# Patient Record
Sex: MALE | Race: WHITE | NOT HISPANIC OR LATINO
[De-identification: names, ages, dates, MRNs, and addresses within clinical notes are randomized per-mention and may not be internally consistent; named-entity substitution may affect disease eponyms.]

---

## 2019-01-22 PROBLEM — Z00.00 ENCOUNTER FOR PREVENTIVE HEALTH EXAMINATION: Status: ACTIVE | Noted: 2019-01-22

## 2019-02-11 ENCOUNTER — RX RENEWAL (OUTPATIENT)
Age: 59
End: 2019-02-11

## 2019-02-11 ENCOUNTER — RECORD ABSTRACTING (OUTPATIENT)
Age: 59
End: 2019-02-11

## 2019-03-22 ENCOUNTER — APPOINTMENT (OUTPATIENT)
Dept: UROLOGY | Facility: CLINIC | Age: 59
End: 2019-03-22

## 2019-08-29 ENCOUNTER — RECORD ABSTRACTING (OUTPATIENT)
Age: 59
End: 2019-08-29

## 2019-08-29 DIAGNOSIS — Z87.898 PERSONAL HISTORY OF OTHER SPECIFIED CONDITIONS: ICD-10-CM

## 2019-08-29 DIAGNOSIS — Z82.49 FAMILY HISTORY OF ISCHEMIC HEART DISEASE AND OTHER DISEASES OF THE CIRCULATORY SYSTEM: ICD-10-CM

## 2019-08-29 DIAGNOSIS — R07.89 OTHER CHEST PAIN: ICD-10-CM

## 2019-08-29 DIAGNOSIS — Z80.9 FAMILY HISTORY OF MALIGNANT NEOPLASM, UNSPECIFIED: ICD-10-CM

## 2019-08-29 DIAGNOSIS — Z83.3 FAMILY HISTORY OF DIABETES MELLITUS: ICD-10-CM

## 2019-08-29 DIAGNOSIS — Z78.9 OTHER SPECIFIED HEALTH STATUS: ICD-10-CM

## 2019-10-15 DIAGNOSIS — Z87.891 PERSONAL HISTORY OF NICOTINE DEPENDENCE: ICD-10-CM

## 2019-10-15 DIAGNOSIS — Z82.49 FAMILY HISTORY OF ISCHEMIC HEART DISEASE AND OTHER DISEASES OF THE CIRCULATORY SYSTEM: ICD-10-CM

## 2019-10-15 DIAGNOSIS — I25.10 ATHEROSCLEROTIC HEART DISEASE OF NATIVE CORONARY ARTERY W/OUT ANGINA PECTORIS: ICD-10-CM

## 2019-10-16 ENCOUNTER — NON-APPOINTMENT (OUTPATIENT)
Age: 59
End: 2019-10-16

## 2019-10-16 ENCOUNTER — APPOINTMENT (OUTPATIENT)
Dept: CARDIOLOGY | Facility: CLINIC | Age: 59
End: 2019-10-16
Payer: COMMERCIAL

## 2019-10-16 VITALS
HEIGHT: 69 IN | BODY MASS INDEX: 28.14 KG/M2 | WEIGHT: 190 LBS | HEART RATE: 61 BPM | DIASTOLIC BLOOD PRESSURE: 70 MMHG | SYSTOLIC BLOOD PRESSURE: 110 MMHG

## 2019-10-16 PROCEDURE — 99214 OFFICE O/P EST MOD 30 MIN: CPT

## 2019-10-16 PROCEDURE — 93000 ELECTROCARDIOGRAM COMPLETE: CPT

## 2019-10-16 RX ORDER — GABAPENTIN 300 MG/1
300 CAPSULE ORAL ONCE
Refills: 0 | Status: ACTIVE | COMMUNITY

## 2019-10-16 NOTE — HISTORY OF PRESENT ILLNESS
[FreeTextEntry1] : This 59 year-old male patient presents for cardiovascular evaluation.\par \par His problem list is as noted above.\par \par Since last examination approximately 1 year ago the patient has had a significant medical event.  The patient suffered a slip and fall on his way to work in January 2019 and suffered a neck injury.  He was initially evaluated at [facility] and discharged home.  He subsequently had evaluation in [place] and was found to have a major cervical disc–spine injury.  He reports that he had weakness of all 4 extremities.  He underwent urgent surgery.  The details are not available.  He underwent extensive rehabilitation.  There was no cardiovascular complication.\par \par The patient has made progress with physical therapy but continues with a major gait disturbance.  He is recovering some of his strength but walks hesitantly.  There have been no falls.  No symptoms of chest discomfort, shortness of breath, palpitation, lightheadedness.

## 2019-10-16 NOTE — DISCUSSION/SUMMARY
[FreeTextEntry1] : Brief recommendations and follow-up: (see above for details)\par \par Patient is making a satisfactory recovery after major cervical spine trauma and surgery.\par There were no cardiovascular complications.\par His overall cardiovascular status is stable.\par He will provide recent laboratories.\par Continue current routine.\par Next routine follow-up 1 year

## 2019-10-16 NOTE — PHYSICAL EXAM
[General Appearance - Well Developed] : well developed [Normal Appearance] : normal appearance [Well Groomed] : well groomed [General Appearance - Well Nourished] : well nourished [No Deformities] : no deformities [General Appearance - In No Acute Distress] : no acute distress [Normal Conjunctiva] : the conjunctiva exhibited no abnormalities [Eyelids - No Xanthelasma] : the eyelids demonstrated no xanthelasmas [Normal Oral Mucosa] : normal oral mucosa [No Oral Pallor] : no oral pallor [No Oral Cyanosis] : no oral cyanosis [Normal Jugular Venous A Waves Present] : normal jugular venous A waves present [Normal Jugular Venous V Waves Present] : normal jugular venous V waves present [No Jugular Venous Posadas A Waves] : no jugular venous posadsa A waves [Respiration, Rhythm And Depth] : normal respiratory rhythm and effort [Exaggerated Use Of Accessory Muscles For Inspiration] : no accessory muscle use [Auscultation Breath Sounds / Voice Sounds] : lungs were clear to auscultation bilaterally [Heart Rate And Rhythm] : heart rate and rhythm were normal [Heart Sounds] : normal S1 and S2 [Murmurs] : no murmurs present [Abdomen Soft] : soft [Abdomen Tenderness] : non-tender [Abdomen Mass (___ Cm)] : no abdominal mass palpated [Nail Clubbing] : no clubbing of the fingernails [Cyanosis, Localized] : no localized cyanosis [Petechial Hemorrhages (___cm)] : no petechial hemorrhages [Skin Color & Pigmentation] : normal skin color and pigmentation [] : no rash [No Venous Stasis] : no venous stasis [Skin Lesions] : no skin lesions [No Skin Ulcers] : no skin ulcer [No Xanthoma] : no  xanthoma was observed [Oriented To Time, Place, And Person] : oriented to person, place, and time [Affect] : the affect was normal [Mood] : the mood was normal [No Anxiety] : not feeling anxious [FreeTextEntry1] : He has a gait disturbance.

## 2019-10-16 NOTE — REASON FOR VISIT
[FreeTextEntry1] : Mr. TAYLER MARKS has the following problem list:\par \par Coronary artery disease/abnormal stress mibi\par Hypertension\par Dyslipidemia.\par \par He has additional medical problems as noted.\par \par His primary care physician is Dr. Murphy Clarke.

## 2020-01-02 ENCOUNTER — RX RENEWAL (OUTPATIENT)
Age: 60
End: 2020-01-02

## 2020-11-16 ENCOUNTER — APPOINTMENT (OUTPATIENT)
Dept: CARDIOLOGY | Facility: CLINIC | Age: 60
End: 2020-11-16
Payer: COMMERCIAL

## 2020-11-16 ENCOUNTER — NON-APPOINTMENT (OUTPATIENT)
Age: 60
End: 2020-11-16

## 2020-11-16 VITALS
BODY MASS INDEX: 27.7 KG/M2 | HEIGHT: 69 IN | HEART RATE: 60 BPM | WEIGHT: 187 LBS | SYSTOLIC BLOOD PRESSURE: 102 MMHG | DIASTOLIC BLOOD PRESSURE: 70 MMHG

## 2020-11-16 DIAGNOSIS — Z01.89 ENCOUNTER FOR OTHER SPECIFIED SPECIAL EXAMINATIONS: ICD-10-CM

## 2020-11-16 DIAGNOSIS — Z92.89 PERSONAL HISTORY OF OTHER MEDICAL TREATMENT: ICD-10-CM

## 2020-11-16 PROCEDURE — 99072 ADDL SUPL MATRL&STAF TM PHE: CPT

## 2020-11-16 PROCEDURE — 93000 ELECTROCARDIOGRAM COMPLETE: CPT

## 2020-11-16 PROCEDURE — 99214 OFFICE O/P EST MOD 30 MIN: CPT

## 2020-11-16 RX ORDER — NITROGLYCERIN 0.4 MG/1
0.4 TABLET SUBLINGUAL
Qty: 25 | Refills: 3 | Status: ACTIVE | COMMUNITY
Start: 1900-01-01 | End: 1900-01-01

## 2020-11-16 NOTE — DISCUSSION/SUMMARY
[FreeTextEntry1] : Brief recommendations and follow-up: (see above for details)\par \par Patient's overall cardiovascular status is stable and very well compensated.\par Focus on risk factor reduction.\par He is leaving the area and I wished him well.\par We will remain available on request.

## 2020-11-16 NOTE — ASSESSMENT
[FreeTextEntry1] : EKG 11/16/2020.  Sinus rhythm, within normal limits.\par EKG 10/16/2019.  Sinus rhythm.  Within normal limits.

## 2020-11-16 NOTE — HISTORY OF PRESENT ILLNESS
[FreeTextEntry1] : This 60 year-old male patient presents for cardiovascular evaluation.\par \par His problem list is as noted above.\par \par Since his last examination 1 year ago he reports no major events or hospitalizations.  He tries to stay active but he has significant neurologic functional limitations with some proximal muscle weakness.\par \par There have been no acute symptoms of shortness of breath, chest discomfort, palpitation, lightheadedness, fainting.\par \par He has seen his primary care physician and had laboratories 1 week ago.  I reviewed the findings on his phone and he will provide a hard copy for my records.\par \par The patient also reports that he will be leaving the area.\par

## 2021-07-06 ENCOUNTER — RX RENEWAL (OUTPATIENT)
Age: 61
End: 2021-07-06

## 2021-10-11 ENCOUNTER — RX RENEWAL (OUTPATIENT)
Age: 61
End: 2021-10-11

## 2021-10-11 RX ORDER — AMLODIPINE BESYLATE 5 MG/1
5 TABLET ORAL DAILY
Qty: 90 | Refills: 1 | Status: ACTIVE | COMMUNITY
Start: 2021-10-11 | End: 1900-01-01

## 2022-01-07 ENCOUNTER — TRANSCRIPTION ENCOUNTER (OUTPATIENT)
Age: 62
End: 2022-01-07

## 2022-01-07 ENCOUNTER — NON-APPOINTMENT (OUTPATIENT)
Age: 62
End: 2022-01-07

## 2022-01-07 ENCOUNTER — APPOINTMENT (OUTPATIENT)
Dept: CARDIOLOGY | Facility: CLINIC | Age: 62
End: 2022-01-07
Payer: MEDICARE

## 2022-01-07 VITALS
SYSTOLIC BLOOD PRESSURE: 134 MMHG | WEIGHT: 190 LBS | HEART RATE: 70 BPM | BODY MASS INDEX: 28.14 KG/M2 | DIASTOLIC BLOOD PRESSURE: 76 MMHG | HEIGHT: 69 IN | OXYGEN SATURATION: 98 %

## 2022-01-07 VITALS — DIASTOLIC BLOOD PRESSURE: 70 MMHG | SYSTOLIC BLOOD PRESSURE: 128 MMHG

## 2022-01-07 PROCEDURE — 93000 ELECTROCARDIOGRAM COMPLETE: CPT

## 2022-01-07 PROCEDURE — 99204 OFFICE O/P NEW MOD 45 MIN: CPT | Mod: 25

## 2022-01-07 RX ORDER — OXYBUTYNIN CHLORIDE 5 MG/1
5 TABLET, EXTENDED RELEASE ORAL DAILY
Refills: 0 | Status: ACTIVE | COMMUNITY

## 2022-01-07 NOTE — DISCUSSION/SUMMARY
[FreeTextEntry1] : 1. Coronary Artery Disease: after review of previous cardiologist's note this is presumed CAD based upon a mildly abnormal nuclear stress test about 4-5 years ago. Patient currently asymptomatic. Recommend follow up pharmacologic nuclear stress testing when patient returns from [place] in Spring 2022. For now recommend continuing current medical therapy.\par \par 2. HTN: appears controlled. For now continue amlodipine 5mg daily. Would not recommend discontinuing BP medication at this time as patient will be out of state for the winter. Can reassess when he returns for the spring/summer.\par \par 3. HLD: continue pravastatin 20mg daily. Will attempt to obtain most recent labs from PCP.\par \par Follow up in 6 months.

## 2022-01-07 NOTE — HISTORY OF PRESENT ILLNESS
[FreeTextEntry1] : 61 year old male, former cardiologist was Dr. Theodore ([place]), history of CAD (mildly abnormal nuclear stress test about 4 years ago), HTN, HLD presents for a cardiac evaluation. Patient would like to know if he can come off some of his cardiac medications. Patient has no chest pain, SOB, or palpitations. He is limited however with his exertion because of a previous trauma requiring neck surgery.\par \par There is no history of MI, CVA, CHF, or previous coronary intervention.\par

## 2022-01-07 NOTE — PHYSICAL EXAM
[Normal] : moves all extremities, no focal deficits, normal speech [de-identified] : No carotid bruits auscultated bilaterally

## 2022-01-07 NOTE — REVIEW OF SYSTEMS
[Joint Pain] : joint pain [Joint Stiffness] : joint stiffness [Negative] : Neurological [FreeTextEntry5] : see HPI [FreeTextEntry9] : see HPI

## 2022-07-14 ENCOUNTER — APPOINTMENT (OUTPATIENT)
Dept: CARDIOLOGY | Facility: CLINIC | Age: 62
End: 2022-07-14

## 2022-07-18 ENCOUNTER — RX RENEWAL (OUTPATIENT)
Age: 62
End: 2022-07-18

## 2022-11-21 ENCOUNTER — APPOINTMENT (OUTPATIENT)
Dept: ULTRASOUND IMAGING | Facility: CLINIC | Age: 62
End: 2022-11-21

## 2022-11-21 PROCEDURE — 76700 US EXAM ABDOM COMPLETE: CPT

## 2022-12-22 ENCOUNTER — NON-APPOINTMENT (OUTPATIENT)
Age: 62
End: 2022-12-22

## 2022-12-22 ENCOUNTER — APPOINTMENT (OUTPATIENT)
Dept: CARDIOLOGY | Facility: CLINIC | Age: 62
End: 2022-12-22

## 2022-12-22 VITALS
DIASTOLIC BLOOD PRESSURE: 76 MMHG | SYSTOLIC BLOOD PRESSURE: 128 MMHG | WEIGHT: 180 LBS | HEIGHT: 69 IN | BODY MASS INDEX: 26.66 KG/M2 | OXYGEN SATURATION: 99 % | HEART RATE: 72 BPM

## 2022-12-22 PROCEDURE — 99214 OFFICE O/P EST MOD 30 MIN: CPT | Mod: 25

## 2022-12-22 PROCEDURE — 93000 ELECTROCARDIOGRAM COMPLETE: CPT

## 2022-12-22 RX ORDER — ASPIRIN ENTERIC COATED TABLETS 81 MG 81 MG/1
81 TABLET, DELAYED RELEASE ORAL
Refills: 0 | Status: ACTIVE | COMMUNITY
Start: 2022-12-22

## 2022-12-22 RX ORDER — KRILL/OM-3/DHA/EPA/PHOSPHO/AST 1000-230MG
81 CAPSULE ORAL DAILY
Refills: 0 | Status: DISCONTINUED | COMMUNITY
End: 2022-12-22

## 2022-12-22 NOTE — DISCUSSION/SUMMARY
[FreeTextEntry1] : 1. Coronary Artery Disease: after review of previous cardiologist's note this is presumed CAD based upon a mildly abnormal nuclear stress test about 4-5 years ago. Patient currently asymptomatic. Recommend continuing current medical therapy.\par \par 2. HTN: Goal BP less than 130/80. Recommend low salt diet. Continue amlodipine 5mg daily. \par \par 3. HLD: continue pravastatin 20mg daily. Will attempt to obtain most recent labs from PCP.\par \par Follow up in 12 months. [EKG obtained to assist in diagnosis and management of assessed problem(s)] : EKG obtained to assist in diagnosis and management of assessed problem(s)

## 2022-12-22 NOTE — PHYSICAL EXAM
[Normal] : moves all extremities, no focal deficits, normal speech [de-identified] : No carotid bruits auscultated bilaterally

## 2022-12-22 NOTE — REVIEW OF SYSTEMS
(1) body pink, extremities blue [Joint Pain] : joint pain [Joint Stiffness] : joint stiffness [Negative] : Neurological [FreeTextEntry5] : see HPI [FreeTextEntry9] : see HPI

## 2023-06-23 ENCOUNTER — RX RENEWAL (OUTPATIENT)
Age: 63
End: 2023-06-23

## 2023-09-22 ENCOUNTER — RX RENEWAL (OUTPATIENT)
Age: 63
End: 2023-09-22

## 2023-09-22 RX ORDER — PRAVASTATIN SODIUM 20 MG/1
20 TABLET ORAL
Qty: 90 | Refills: 3 | Status: ACTIVE | COMMUNITY
Start: 2021-07-06 | End: 1900-01-01

## 2023-12-22 ENCOUNTER — APPOINTMENT (OUTPATIENT)
Dept: CARDIOLOGY | Facility: CLINIC | Age: 63
End: 2023-12-22
Payer: MEDICARE

## 2023-12-22 ENCOUNTER — NON-APPOINTMENT (OUTPATIENT)
Age: 63
End: 2023-12-22

## 2023-12-22 VITALS
DIASTOLIC BLOOD PRESSURE: 70 MMHG | BODY MASS INDEX: 27.4 KG/M2 | HEART RATE: 60 BPM | HEIGHT: 69 IN | WEIGHT: 185 LBS | OXYGEN SATURATION: 98 % | SYSTOLIC BLOOD PRESSURE: 146 MMHG

## 2023-12-22 DIAGNOSIS — I10 ESSENTIAL (PRIMARY) HYPERTENSION: ICD-10-CM

## 2023-12-22 DIAGNOSIS — I25.10 ATHEROSCLEROTIC HEART DISEASE OF NATIVE CORONARY ARTERY W/OUT ANGINA PECTORIS: ICD-10-CM

## 2023-12-22 DIAGNOSIS — E78.5 HYPERLIPIDEMIA, UNSPECIFIED: ICD-10-CM

## 2023-12-22 PROCEDURE — 99214 OFFICE O/P EST MOD 30 MIN: CPT | Mod: 25

## 2023-12-22 PROCEDURE — 93000 ELECTROCARDIOGRAM COMPLETE: CPT

## 2023-12-22 NOTE — CARDIOLOGY SUMMARY
[de-identified] : 12/22/2023, Sinus Bradycardia, normal ECG 12/22/2022, Sinus Bradycardia with non-specific ST-T wave changes.

## 2023-12-22 NOTE — PHYSICAL EXAM
[Normal] : moves all extremities, no focal deficits, normal speech [de-identified] : No carotid bruits auscultated bilaterally

## 2023-12-22 NOTE — DISCUSSION/SUMMARY
[FreeTextEntry1] : 1. Coronary Artery Disease: after review of previous cardiologist's note this is presumed CAD based upon a mildly abnormal nuclear stress test about 5-6 years ago. Patient currently asymptomatic. Recommend continuing current medical therapy.  2. HTN: Goal BP less than 130/80. Recommend low salt diet. Continue amlodipine 5mg daily.   3. HLD: continue pravastatin 20mg daily. Will attempt to obtain most recent labs from PCP.  Follow up in 12 months. [EKG obtained to assist in diagnosis and management of assessed problem(s)] : EKG obtained to assist in diagnosis and management of assessed problem(s)

## 2023-12-22 NOTE — HISTORY OF PRESENT ILLNESS
[FreeTextEntry1] : Historical Perspective:\par  62 year old male, former cardiologist was Dr. Theodore ([place]), history of CAD (mildly abnormal nuclear stress test about 4 years ago), HTN, HLD presents for a cardiac evaluation. Patient would like to know if he can come off some of his cardiac medications. Patient has no chest pain, SOB, or palpitations. He is limited however with his exertion because of a previous trauma requiring neck surgery.\par  \par  There is no history of MI, CVA, CHF, or previous coronary intervention.\par  \par  Current Health Status:\par  Patient with no chest pain, SOB, or palpitations. No hospitalizations since seeing me last. Remains compliant with his medications and reports no adverse effects.\par  \par

## 2024-10-03 ENCOUNTER — NON-APPOINTMENT (OUTPATIENT)
Age: 64
End: 2024-10-03

## 2024-10-04 ENCOUNTER — APPOINTMENT (OUTPATIENT)
Dept: CARDIOLOGY | Facility: CLINIC | Age: 64
End: 2024-10-04
Payer: MEDICARE

## 2024-10-04 VITALS
WEIGHT: 190 LBS | SYSTOLIC BLOOD PRESSURE: 152 MMHG | HEIGHT: 69 IN | HEART RATE: 96 BPM | BODY MASS INDEX: 28.14 KG/M2 | DIASTOLIC BLOOD PRESSURE: 70 MMHG | OXYGEN SATURATION: 96 %

## 2024-10-04 DIAGNOSIS — I10 ESSENTIAL (PRIMARY) HYPERTENSION: ICD-10-CM

## 2024-10-04 DIAGNOSIS — E78.5 HYPERLIPIDEMIA, UNSPECIFIED: ICD-10-CM

## 2024-10-04 DIAGNOSIS — I25.10 ATHEROSCLEROTIC HEART DISEASE OF NATIVE CORONARY ARTERY W/OUT ANGINA PECTORIS: ICD-10-CM

## 2024-10-04 PROCEDURE — G2211 COMPLEX E/M VISIT ADD ON: CPT

## 2024-10-04 PROCEDURE — 99215 OFFICE O/P EST HI 40 MIN: CPT

## 2024-10-04 PROCEDURE — 93000 ELECTROCARDIOGRAM COMPLETE: CPT

## 2024-10-04 RX ORDER — SULFAMETHOXAZOLE AND TRIMETHOPRIM 800; 160 MG/1; MG/1
800-160 TABLET ORAL TWICE DAILY
Refills: 0 | Status: ACTIVE | COMMUNITY

## 2024-10-04 NOTE — DISCUSSION/SUMMARY
[FreeTextEntry1] : 1. Coronary Artery Disease: after review of previous cardiologist's note this is presumed CAD based upon a mildly abnormal nuclear stress test about 6-7 years ago. Patient currently asymptomatic, however, patient went to [facility] ED yesterday with low back, flank pain. Found to have UTI and now on ABx. He was in good amount of discomfort and BP is elevated. Patient also had mildly elevated troponins. He states they wanted to keep him in hospital but patient declined. Patient also found to have LBBB, which is new compared to previous ECG, however, patient tachycardic during the ECG, so this could just be rate related. Advising CTA coronary arteries and echocardiogram. Red flag precautions and when to call 911 was discussed with patient and wife.  2. HTN: Goal BP less than 130/80. Recommend low salt diet. Continue amlodipine 5mg daily.   3. HLD: continue pravastatin 20mg daily.   Follow up after testing. [EKG obtained to assist in diagnosis and management of assessed problem(s)] : EKG obtained to assist in diagnosis and management of assessed problem(s)

## 2024-10-04 NOTE — HISTORY OF PRESENT ILLNESS
[FreeTextEntry1] : Historical Perspective: 64 year old male, former cardiologist was Dr. Theodore ([place]), history of CAD (mildly abnormal nuclear stress test about 4 years ago), HTN, HLD presents for a cardiac evaluation. Patient would like to know if he can come off some of his cardiac medications. Patient has no chest pain, SOB, or palpitations. He is limited however with his exertion because of a previous trauma requiring neck surgery.  There is no history of MI, CVA, CHF, or previous coronary intervention.  Current Health Status: Patient went to [facility] ED yesterday with low back, flank pain. Found to have UTI and now on ABx. He was in good amount of discomfort and BP is elevated. Patient also had mildly elevated troponins. He states they wanted to keep him in hospital but patient declined. He has no chest pain or pressure. No abnormal dyspnea.

## 2024-10-04 NOTE — CARDIOLOGY SUMMARY
[de-identified] : 10/3/2024, Sinus Tachycardia with LBBB 12/22/2023, Sinus Bradycardia, normal ECG 12/22/2022, Sinus Bradycardia with non-specific ST-T wave changes.
28-Dec-2020 15:51

## 2024-10-04 NOTE — PHYSICAL EXAM
[Normal] : moves all extremities, no focal deficits, normal speech [de-identified] : No carotid bruits auscultated bilaterally

## 2024-10-04 NOTE — REVIEW OF SYSTEMS
[Joint Pain] : joint pain [Joint Stiffness] : joint stiffness [Negative] : Neurological [FreeTextEntry9] : see HPI [FreeTextEntry5] : see HPI

## 2024-10-08 NOTE — HISTORY OF PRESENT ILLNESS
[FreeTextEntry1] : Historical Perspective:\par 62 year old male, former cardiologist was Dr. Theodore ([place]), history of CAD (mildly abnormal nuclear stress test about 4 years ago), HTN, HLD presents for a cardiac evaluation. Patient would like to know if he can come off some of his cardiac medications. Patient has no chest pain, SOB, or palpitations. He is limited however with his exertion because of a previous trauma requiring neck surgery.\par \par There is no history of MI, CVA, CHF, or previous coronary intervention.\par \par Current Health Status:\par Patient with no chest pain, SOB, or palpitations. No hospitalizations since seeing me last. Remains compliant with his medications and reports no adverse effects.\par \par 
5

## 2024-10-16 ENCOUNTER — APPOINTMENT (OUTPATIENT)
Dept: CARDIOLOGY | Facility: CLINIC | Age: 64
End: 2024-10-16
Payer: MEDICARE

## 2024-10-16 PROCEDURE — 93306 TTE W/DOPPLER COMPLETE: CPT

## 2024-10-16 PROCEDURE — 76376 3D RENDER W/INTRP POSTPROCES: CPT

## 2024-10-29 ENCOUNTER — NON-APPOINTMENT (OUTPATIENT)
Age: 64
End: 2024-10-29

## 2024-10-29 ENCOUNTER — APPOINTMENT (OUTPATIENT)
Dept: UROLOGY | Facility: CLINIC | Age: 64
End: 2024-10-29
Payer: MEDICARE

## 2024-10-29 VITALS
SYSTOLIC BLOOD PRESSURE: 166 MMHG | BODY MASS INDEX: 28.14 KG/M2 | DIASTOLIC BLOOD PRESSURE: 91 MMHG | HEART RATE: 73 BPM | TEMPERATURE: 97.3 F | HEIGHT: 69 IN | WEIGHT: 190 LBS

## 2024-10-29 DIAGNOSIS — R39.9 UNSPECIFIED SYMPTOMS AND SIGNS INVOLVING THE GENITOURINARY SYSTEM: ICD-10-CM

## 2024-10-29 PROCEDURE — 99203 OFFICE O/P NEW LOW 30 MIN: CPT

## 2024-11-19 ENCOUNTER — APPOINTMENT (OUTPATIENT)
Dept: CT IMAGING | Facility: CLINIC | Age: 64
End: 2024-11-19

## 2024-11-19 PROCEDURE — 74178 CT ABD&PLV WO CNTR FLWD CNTR: CPT

## 2024-11-25 ENCOUNTER — APPOINTMENT (OUTPATIENT)
Dept: ULTRASOUND IMAGING | Facility: CLINIC | Age: 64
End: 2024-11-25
Payer: MEDICARE

## 2024-11-25 PROCEDURE — 76770 US EXAM ABDO BACK WALL COMP: CPT

## 2024-12-05 ENCOUNTER — APPOINTMENT (OUTPATIENT)
Dept: CARDIOLOGY | Facility: CLINIC | Age: 64
End: 2024-12-05
Payer: MEDICARE

## 2024-12-05 VITALS
DIASTOLIC BLOOD PRESSURE: 66 MMHG | WEIGHT: 190 LBS | HEIGHT: 69 IN | BODY MASS INDEX: 28.14 KG/M2 | SYSTOLIC BLOOD PRESSURE: 136 MMHG | OXYGEN SATURATION: 98 % | HEART RATE: 71 BPM

## 2024-12-05 DIAGNOSIS — I25.10 ATHEROSCLEROTIC HEART DISEASE OF NATIVE CORONARY ARTERY W/OUT ANGINA PECTORIS: ICD-10-CM

## 2024-12-05 DIAGNOSIS — I10 ESSENTIAL (PRIMARY) HYPERTENSION: ICD-10-CM

## 2024-12-05 DIAGNOSIS — E78.5 HYPERLIPIDEMIA, UNSPECIFIED: ICD-10-CM

## 2024-12-05 PROCEDURE — G2211 COMPLEX E/M VISIT ADD ON: CPT

## 2024-12-05 PROCEDURE — 99214 OFFICE O/P EST MOD 30 MIN: CPT

## 2024-12-05 RX ORDER — TAMSULOSIN HYDROCHLORIDE 0.4 MG/1
0.4 CAPSULE ORAL
Refills: 0 | Status: ACTIVE | COMMUNITY